# Patient Record
Sex: FEMALE | Employment: UNEMPLOYED | URBAN - METROPOLITAN AREA
[De-identification: names, ages, dates, MRNs, and addresses within clinical notes are randomized per-mention and may not be internally consistent; named-entity substitution may affect disease eponyms.]

---

## 2017-03-09 ENCOUNTER — OFFICE VISIT (OUTPATIENT)
Dept: FAMILY MEDICINE | Facility: CLINIC | Age: 22
End: 2017-03-09
Payer: COMMERCIAL

## 2017-03-09 VITALS
HEART RATE: 110 BPM | DIASTOLIC BLOOD PRESSURE: 81 MMHG | WEIGHT: 83.8 LBS | BODY MASS INDEX: 17.59 KG/M2 | SYSTOLIC BLOOD PRESSURE: 119 MMHG | HEIGHT: 58 IN | OXYGEN SATURATION: 98 %

## 2017-03-09 DIAGNOSIS — Z23 NEED FOR HPV VACCINATION: ICD-10-CM

## 2017-03-09 DIAGNOSIS — Z12.4 SCREENING FOR MALIGNANT NEOPLASM OF CERVIX: ICD-10-CM

## 2017-03-09 DIAGNOSIS — Z82.49 FAMILY HISTORY OF ISCHEMIC HEART DISEASE: Primary | ICD-10-CM

## 2017-03-09 DIAGNOSIS — Z23 NEED FOR TETANUS BOOSTER: ICD-10-CM

## 2017-03-09 DIAGNOSIS — Z01.419 PAP SMEAR, LOW-RISK: ICD-10-CM

## 2017-03-09 PROCEDURE — 99395 PREV VISIT EST AGE 18-39: CPT | Mod: 25 | Performed by: NURSE PRACTITIONER

## 2017-03-09 PROCEDURE — 90471 IMMUNIZATION ADMIN: CPT | Performed by: NURSE PRACTITIONER

## 2017-03-09 PROCEDURE — G0145 SCR C/V CYTO,THINLAYER,RESCR: HCPCS | Performed by: NURSE PRACTITIONER

## 2017-03-09 PROCEDURE — 90472 IMMUNIZATION ADMIN EACH ADD: CPT | Performed by: NURSE PRACTITIONER

## 2017-03-09 PROCEDURE — 90715 TDAP VACCINE 7 YRS/> IM: CPT | Performed by: NURSE PRACTITIONER

## 2017-03-09 PROCEDURE — 90649 4VHPV VACCINE 3 DOSE IM: CPT | Performed by: NURSE PRACTITIONER

## 2017-03-09 NOTE — MR AVS SNAPSHOT
After Visit Summary   3/9/2017    Sisi Tomlin    MRN: [id number]           Patient Information     Date Of Birth          1995        Visit Information        Provider Department      3/9/2017 11:20 AM Juanita Jose APRN [facility]        Today's Diagnoses     Family history of ischemic heart disease    -  1      Care Instructions      1. Schedule fasting cholesterol       Preventive Health Recommendations  Female Ages 18 to 25     Yearly exam:     See your health care provider every year in order to  o Review health changes.   o Discuss preventive care.    o Review your medicines if your doctor has prescribed any.      You should be tested each year for STDs (sexually transmitted diseases).       After age 20, talk to your provider about how often you should have cholesterol testing.      Starting at age 21, get a Pap test every three years. If you have an abnormal result, your doctor may have you test more often.      If you are at risk for diabetes, you should have a diabetes test (fasting glucose).     Shots:     Get a flu shot each year.     Get a tetanus shot every 10 years.     Consider getting the shot (vaccine) that prevents cervical cancer (Gardasil).    Nutrition:     Eat at least 5 servings of fruits and vegetables each day.    Eat whole-grain bread, whole-wheat pasta and brown rice instead of white grains and rice.    Talk to your provider about Calcium and Vitamin D.     Lifestyle    Exercise at least 150 minutes a week each week (30 minutes a day, 5 days a week). This will help you control your weight and prevent disease.    Limit alcohol to one drink per day.    No smoking.     Wear sunscreen to prevent skin cancer.    See your dentist every six months for an exam and cleaning.        Follow-ups after your visit        Future tests that were ordered for you today     Open Future Orders        Priority Expected Expires Ordered    Lipid Profile with  "reflex to direct LDL Routine 3/10/2017 3/9/2018 3/9/2017            Who to contact     If you have questions or need follow up information about today's clinic visit or your schedule please contact [facility] directly at [phone].  Normal or non-critical lab and imaging results will be communicated to you by MyChart, letter or phone within 4 business days after the clinic has received the results. If you do not hear from us within 7 days, please contact the clinic through MyChart or phone. If you have a critical or abnormal lab result, we will notify you by phone as soon as possible.  Submit refill requests through [facility] or call your pharmacy and they will forward the refill request to us. Please allow 3 business days for your refill to be completed.          Additional Information About Your Visit        MyChart Information     [facility] lets you send messages to your doctor, view your test results, renew your prescriptions, schedule appointments and more. To sign up, go to [email or web address] . Click on \"Log in\" on the left side of the screen, which will take you to the Welcome page. Then click on \"Sign up Now\" on the right side of the page.     You will be asked to enter the access code listed below, as well as some personal information. Please follow the directions to create your username and password.     Your access code is: [email or web address]  Expires: 2017 11:54 AM     Your access code will  in 90 days. If you need help or a new code, please call your [facility] or [phone].        Care EveryWhere ID     This is your Care EveryWhere ID. This could be used by other organizations to access your [place] medical records  [id number]        Your Vitals Were     Pulse Height Last Period Pulse Oximetry BMI (Body Mass Index)       110 4' 9.5\" (1.461 m) 2017 98% 17.82 kg/m2        Blood Pressure from Last 3 Encounters:   17 119/81   13 (!) 86/63   12 113/73 "    Weight from Last 3 Encounters:   03/09/17 83 lb 12.8 oz (38 kg)   07/22/13 86 lb (39 kg) (<1 %)*   06/13/12 88 lb 12.8 oz (40.3 kg) (<1 %)*     * Growth percentiles are based on [facility] 2-20 Years data.               Primary Care Provider Office Phone # Fax #    Taylor Polly Israel [phone]       XXX RESIGNED XXX [address] [address]        Thank you!     Thank you for choosing [facility]  for your care. Our goal is always to provide you with excellent care. Hearing back from our patients is one way we can continue to improve our services. Please take a few minutes to complete the written survey that you may receive in the mail after your visit with us. Thank you!             Your Updated Medication List - Protect others around you: Learn how to safely use, store and throw away your medicines at www.disposemymeds.org.          This list is accurate as of: 3/9/17 11:54 AM.  Always use your most recent med list.                   Brand Name Dispense Instructions for use    MULTI-DAY VITAMINS PO      None Entered

## 2017-03-09 NOTE — NURSING NOTE
"Initial /81 (BP Location: Left arm, Patient Position: Chair, Cuff Size: Adult Regular)  Pulse 110  Ht 4' 9.5\" (1.461 m)  Wt 83 lb 12.8 oz (38 kg)  LMP 02/04/2017  SpO2 98%  BMI 17.82 kg/m2 Estimated body mass index is 17.82 kg/(m^2) as calculated from the following:    Height as of this encounter: 4' 9.5\" (1.461 m).    Weight as of this encounter: 83 lb 12.8 oz (38 kg). .    Ivonne Mesa    "

## 2017-03-09 NOTE — PATIENT INSTRUCTIONS
1. Schedule fasting cholesterol       Preventive Health Recommendations  Female Ages 18 to 25     Yearly exam:     See your health care provider every year in order to  o Review health changes.   o Discuss preventive care.    o Review your medicines if your doctor has prescribed any.      You should be tested each year for STDs (sexually transmitted diseases).       After age 20, talk to your provider about how often you should have cholesterol testing.      Starting at age 21, get a Pap test every three years. If you have an abnormal result, your doctor may have you test more often.      If you are at risk for diabetes, you should have a diabetes test (fasting glucose).     Shots:     Get a flu shot each year.     Get a tetanus shot every 10 years.     Consider getting the shot (vaccine) that prevents cervical cancer (Gardasil).    Nutrition:     Eat at least 5 servings of fruits and vegetables each day.    Eat whole-grain bread, whole-wheat pasta and brown rice instead of white grains and rice.    Talk to your provider about Calcium and Vitamin D.     Lifestyle    Exercise at least 150 minutes a week each week (30 minutes a day, 5 days a week). This will help you control your weight and prevent disease.    Limit alcohol to one drink per day.    No smoking.     Wear sunscreen to prevent skin cancer.    See your dentist every six months for an exam and cleaning.

## 2017-03-09 NOTE — LETTER
[facility]  [address] [address]  [phone]      March 15, 2017    Sisi Tomlin  [address] [facility] [address]          Dear Sisi,    I am happy to inform you that your recent cervical cancer screening test (PAP smear) was normal.      Preventative screening such as this helps insure your health for years to come.  This test should be repeated in 3 years unless otherwise directed.    You will still need to return to the clinic every year for your annual exam and other preventive tests.    Please contact the clinic if you have further questions.      Sincerely,    MAURICIO Hillman CNP/rlm

## 2017-03-09 NOTE — PROGRESS NOTES
SUBJECTIVE:     CC: Sisi Tomlin is an 21 year old woman who presents for preventive health visit.     Healthy Habits:    Do you get at least three servings of calcium containing foods daily (dairy, green leafy vegetables, etc.)? yes    Amount of exercise or daily activities, outside of work: none    Problems taking medications regularly No    Medication side effects: No    Have you had an eye exam in the past two years? no    Do you see a dentist twice per year? yes    Do you have sleep apnea, excessive snoring or daytime drowsiness?no        Family history of heart disease- father had 5 stents placed    Today's PHQ-2 Score:   PHQ-2 ( 1999 Pfizer) 7/22/2013   Q1: Little interest or pleasure in doing things 0   Q2: Feeling down, depressed or hopeless 0   PHQ-2 Score 0       Abuse: Current or Past(Physical, Sexual or Emotional)- No  Do you feel safe in your environment - Yes    Social History   Substance Use Topics     Smoking status: Passive Smoke Exposure - Never Smoker     Smokeless tobacco: Never Used      Comment: when at grand parents house     Alcohol use No     The patient does not drink >3 drinks per day nor >7 drinks per week.    No results for input(s): CHOL, HDL, LDL, TRIG, CHOLHDLRATIO, NHDL in the last 73787 hours.    Reviewed orders with patient.  Reviewed health maintenance and updated orders accordingly - Yes    Mammo Decision Support:  Mammogram not appropriate for this patient based on age.    Pertinent mammograms are reviewed under the imaging tab.  History of abnormal Pap smear: NO - age 21-29 PAP every 3 years recommended    Reviewed and updated as needed this visit by clinical staff         Reviewed and updated as needed this visit by Provider            ROS:  C: NEGATIVE for fever, chills, change in weight  I: NEGATIVE for worrisome rashes, moles or lesions  E: NEGATIVE for vision changes or irritation  ENT: NEGATIVE for ear, mouth and throat problems  R: NEGATIVE for significant  cough or SOB  B: NEGATIVE for masses, tenderness or discharge  CV: NEGATIVE for chest pain, palpitations or peripheral edema  GI: NEGATIVE for nausea, abdominal pain, heartburn, or change in bowel habits  : NEGATIVE for unusual urinary or vaginal symptoms. Periods are regular.  M: NEGATIVE for significant arthralgias or myalgia  N: NEGATIVE for weakness, dizziness or paresthesias  P: NEGATIVE for changes in mood or affect    Problem list, Medication list, Allergies, and Medical/Social/Surgical histories reviewed in [facility] and updated as appropriate.  OBJECTIVE:     There were no vitals taken for this visit.  EXAM:  GENERAL: healthy, alert and no distress  EYES: Eyes grossly normal to inspection, PERRL and conjunctivae and sclerae normal  HENT: ear canals and TM's normal, nose and mouth without ulcers or lesions  NECK: no adenopathy, no asymmetry, masses, or scars and thyroid normal to palpation  RESP: lungs clear to auscultation - no rales, rhonchi or wheezes  CV: regular rate and rhythm, normal S1 S2, no S3 or S4, no murmur, click or rub, no peripheral edema and peripheral pulses strong  ABDOMEN: soft, nontender, no hepatosplenomegaly, no masses and bowel sounds normal   (female): normal female external genitalia, normal urethral meatus, vaginal mucosa pink, moist, well rugated, and normal cervix/adnexa/uterus without masses or discharge  MS: no gross musculoskeletal defects noted, no edema  SKIN: no suspicious lesions or rashes  NEURO: Normal strength and tone, mentation intact and speech normal  PSYCH: mentation appears normal, affect normal/bright    ASSESSMENT/PLAN:     1. Pap smear, low-risk      2. Family history of ischemic heart disease    - Lipid Profile with reflex to direct LDL; Future  - Pap imaged thin layer screen only - recommended age 21 - 24 years    3. Screening for malignant neoplasm of cervix      4. Need for tetanus booster  - TDAP (ADACEL AGES 11-64)    5. Need for HPV vaccination    - EA  "ADD'L VACCINE  - HUMAN PAPILLOMA VIRUS VACCINE    COUNSELING:   Reviewed preventive health counseling, as reflected in patient instructions       Regular exercise       Healthy diet/nutrition         reports that she is a non-smoker but has been exposed to tobacco smoke. She has never used smokeless tobacco.    Estimated body mass index is 18.61 kg/(m^2) as calculated from the following:    Height as of 7/22/13: 4' 9\" (1.448 m).    Weight as of 7/22/13: 86 lb (39 kg).   Weight management plan noted, stable and monitoring    Counseling Resources:  ATP IV Guidelines  Pooled Cohorts Equation Calculator  Breast Cancer Risk Calculator  FRAX Risk Assessment  ICSI Preventive Guidelines  Dietary Guidelines for Americans, 2010  USDA's MyPlate  ASA Prophylaxis  Lung CA Screening    MAURICIO Hillman [facility]  "

## 2017-03-13 DIAGNOSIS — Z82.49 FAMILY HISTORY OF ISCHEMIC HEART DISEASE: ICD-10-CM

## 2017-03-13 LAB
CHOLEST SERPL-MCNC: 118 MG/DL
COPATH REPORT: NORMAL
HDLC SERPL-MCNC: 57 MG/DL
LDLC SERPL CALC-MCNC: 48 MG/DL
NONHDLC SERPL-MCNC: 61 MG/DL
PAP: NORMAL
TRIGL SERPL-MCNC: 66 MG/DL

## 2017-03-13 PROCEDURE — 36415 COLL VENOUS BLD VENIPUNCTURE: CPT | Performed by: NURSE PRACTITIONER

## 2017-03-13 PROCEDURE — 80061 LIPID PANEL: CPT | Performed by: NURSE PRACTITIONER

## 2019-07-09 ENCOUNTER — OFFICE VISIT (OUTPATIENT)
Dept: FAMILY MEDICINE | Facility: CLINIC | Age: 24
End: 2019-07-09
Payer: COMMERCIAL

## 2019-07-09 VITALS
BODY MASS INDEX: 18.55 KG/M2 | HEIGHT: 57 IN | TEMPERATURE: 105 F | OXYGEN SATURATION: 99 % | HEART RATE: 97 BPM | DIASTOLIC BLOOD PRESSURE: 84 MMHG | RESPIRATION RATE: 14 BRPM | WEIGHT: 86 LBS | SYSTOLIC BLOOD PRESSURE: 110 MMHG

## 2019-07-09 DIAGNOSIS — Z00.00 ROUTINE GENERAL MEDICAL EXAMINATION AT A HEALTH CARE FACILITY: Primary | ICD-10-CM

## 2019-07-09 DIAGNOSIS — Z23 NEED FOR VACCINATION: ICD-10-CM

## 2019-07-09 PROCEDURE — 99395 PREV VISIT EST AGE 18-39: CPT | Performed by: NURSE PRACTITIONER

## 2019-07-09 PROCEDURE — 90651 9VHPV VACCINE 2/3 DOSE IM: CPT | Performed by: NURSE PRACTITIONER

## 2019-07-09 PROCEDURE — 90471 IMMUNIZATION ADMIN: CPT | Performed by: NURSE PRACTITIONER

## 2019-07-09 ASSESSMENT — MIFFLIN-ST. JEOR: SCORE: 1008.97

## 2019-07-09 NOTE — NURSING NOTE
Screening Questionnaire for Adult Immunization    Are you sick today?   No   Do you have allergies to medications, food, a vaccine component or latex?   No   Have you ever had a serious reaction after receiving a vaccination?   No   Do you have a long-term health problem with heart disease, lung disease, asthma, kidney disease, metabolic disease (e.g. diabetes), anemia, or other blood disorder?   No   Do you have cancer, leukemia, HIV/AIDS, or any other immune system problem?   No   In the past 3 months, have you taken medications that affect  your immune system, such as prednisone, other steroids, or anticancer drugs; drugs for the treatment of rheumatoid arthritis, Crohn s disease, or psoriasis; or have you had radiation treatments?   No   Have you had a seizure, or a brain or other nervous system problem?   No   During the past year, have you received a transfusion of blood or blood     products, or been given immune (gamma) globulin or antiviral drug?   No   For women: Are you pregnant or is there a chance you could become        pregnant during the next month?   No   Have you received any vaccinations in the past 4 weeks?   No     Immunization questionnaire answers were all negative.        Per orders of Juanita Jose, injection of Gardasil HPV given by vIonne Mesa. Patient instructed to remain in clinic for 15 minutes afterwards, and to report any adverse reaction to me immediately.       Screening performed by Ivonne Mesa on 7/9/2019 at 11:01 AM.

## 2019-07-09 NOTE — PATIENT INSTRUCTIONS
Thank you for choosing [facility].  You may be receiving an email and/or telephone survey request from [facility] [employer] regarding your visit today.  Please take a few minutes to respond to the survey to let us know how we are doing.      If you have questions or concerns, please contact us via [employer] or you can contact your care team at [phone].    Our Clinic hours are:  Monday 6:40 am  to 7:00 pm  Tuesday -Friday 6:40 am to 5:00 pm    The [place] outpatient lab hours are:  Monday - Friday 6:10 am to 4:45 pm  Saturdays 7:00 am to 11:00 am  Appointments are required, call [phone]    If you have clinical questions after hours or would like to schedule an appointment,  call the clinic at [phone].

## 2019-07-09 NOTE — PROGRESS NOTES
SUBJECTIVE:   CC: Sisi Tomlin is an 24 year old woman who presents for preventive health visit.     Healthy Habits:    Getting at least 3 servings of Calcium per day:  Yes    Bi-annual eye exam:  NO    Dental care twice a year:  Yes    Sleep apnea or symptoms of sleep apnea:  None    Diet:  Regular (no restrictions)    Frequency of exercise:  None    Taking medications regularly:  Not Applicable    Barriers to taking medications:  Not applicable    PHQ-2 Total Score:    Additional concerns today:  No              Today's PHQ-2 Score:   PHQ-2 ( 1999 Pfizer) 7/22/2013   Q1: Little interest or pleasure in doing things 0   Q2: Feeling down, depressed or hopeless 0   PHQ-2 Score 0       Abuse: Current or Past(Physical, Sexual or Emotional)- No  Do you feel safe in your environment? Yes    Social History     Tobacco Use     Smoking status: Passive Smoke Exposure - Never Smoker     Smokeless tobacco: Never Used     Tobacco comment: when at grand parents house   Substance Use Topics     Alcohol use: No     If you drink alcohol do you typically have >3 drinks per day or >7 drinks per week? No    Alcohol Use 3/9/2017   Prescreen: >3 drinks/day or >7 drinks/week? The patient does not drink >3 drinks per day nor >7 drinks per week.       Reviewed orders with patient.  Reviewed health maintenance and updated orders accordingly - Yes  BP Readings from Last 3 Encounters:   07/09/19 110/84   03/09/17 119/81   07/22/13 (!) 86/63    Wt Readings from Last 3 Encounters:   07/09/19 39 kg (86 lb)   03/09/17 38 kg (83 lb 12.8 oz)   07/22/13 39 kg (86 lb) (<1 %)*     * Growth percentiles are based on CDC (Girls, 2-20 Years) data.                    Mammogram not appropriate for this patient based on age.    Pertinent mammograms are reviewed under the imaging tab.  History of abnormal Pap smear:   Last 3 Pap and HPV Results:   PAP / HPV 3/9/2017   PAP NIL     PAP / HPV 3/9/2017   PAP NIL     Reviewed and updated as needed this  visit by clinical staff  Tobacco  Meds  Med Hx  Surg Hx  Fam Hx  Soc Hx        Reviewed and updated as needed this visit by Provider            Review of Systems  CONSTITUTIONAL: NEGATIVE for fever, chills, change in weight  INTEGUMENTARU/SKIN: NEGATIVE for worrisome rashes, moles or lesions  EYES: NEGATIVE for vision changes or irritation  ENT: NEGATIVE for ear, mouth and throat problems  RESP: NEGATIVE for significant cough or SOB  BREAST: NEGATIVE for masses, tenderness or discharge  CV: NEGATIVE for chest pain, palpitations or peripheral edema  GI: NEGATIVE for nausea, abdominal pain, heartburn, or change in bowel habits  : NEGATIVE for unusual urinary or vaginal symptoms. Periods are regular.  MUSCULOSKELETAL: NEGATIVE for significant arthralgias or myalgia  NEURO: NEGATIVE for weakness, dizziness or paresthesias  PSYCHIATRIC: NEGATIVE for changes in mood or affect     OBJECTIVE:   There were no vitals taken for this visit.  Physical Exam  GENERAL: healthy, alert and no distress  EYES: Eyes grossly normal to inspection, PERRL and conjunctivae and sclerae normal  HENT: ear canals and TM's normal, nose and mouth without ulcers or lesions  NECK: no adenopathy, no asymmetry, masses, or scars and thyroid normal to palpation  RESP: lungs clear to auscultation - no rales, rhonchi or wheezes  CV: regular rate and rhythm, normal S1 S2, no S3 or S4, no murmur, click or rub, no peripheral edema and peripheral pulses strong  ABDOMEN: soft, nontender, no hepatosplenomegaly, no masses and bowel sounds normal  MS: no gross musculoskeletal defects noted, no edema  SKIN: no suspicious lesions or rashes  NEURO: Normal strength and tone, mentation intact and speech normal  PSYCH: mentation appears normal, affect normal/bright    Diagnostic Test Results:  Labs reviewed in Epic    ASSESSMENT/PLAN:   1. Routine general medical examination at a health care facility        COUNSELING:  Reviewed preventive health counseling, as  "reflected in patient instructions       Regular exercise       Healthy diet/nutrition       Immunizations    Vaccinated for: HPV          Estimated body mass index is 17.82 kg/m  as calculated from the following:    Height as of 3/9/17: 1.461 m (4' 9.5\").    Weight as of 3/9/17: 38 kg (83 lb 12.8 oz).    Weight management plan noted, stable and monitoring     reports that she is a non-smoker but has been exposed to tobacco smoke. She has never used smokeless tobacco.      Counseling Resources:  ATP IV Guidelines  Pooled Cohorts Equation Calculator  Breast Cancer Risk Calculator  FRAX Risk Assessment  ICSI Preventive Guidelines  Dietary Guidelines for Americans, 2010  USDA's MyPlate  ASA Prophylaxis  Lung CA Screening    MAURICIO Hillman CNP  [facility] [place] [facility]  "

## 2020-06-11 ENCOUNTER — HOSPITAL ENCOUNTER (EMERGENCY)
Facility: CLINIC | Age: 25
Discharge: HOME | End: 2020-06-12
Attending: FAMILY MEDICINE | Admitting: FAMILY MEDICINE
Payer: COMMERCIAL

## 2020-06-11 VITALS
BODY MASS INDEX: 19.48 KG/M2 | TEMPERATURE: 98.1 F | HEART RATE: 100 BPM | DIASTOLIC BLOOD PRESSURE: 86 MMHG | SYSTOLIC BLOOD PRESSURE: 120 MMHG | WEIGHT: 90 LBS | RESPIRATION RATE: 16 BRPM | OXYGEN SATURATION: 95 %

## 2020-06-11 DIAGNOSIS — S02.651A: ICD-10-CM

## 2020-06-11 DIAGNOSIS — R55 VASOVAGAL SYNCOPE: ICD-10-CM

## 2020-06-11 DIAGNOSIS — R68.84 JAW PAIN: ICD-10-CM

## 2020-06-11 PROCEDURE — 81025 URINE PREGNANCY TEST: CPT | Performed by: EMERGENCY MEDICINE

## 2020-06-11 PROCEDURE — 99285 EMERGENCY DEPT VISIT HI MDM: CPT | Mod: 25

## 2020-06-11 PROCEDURE — 25000132 ZZH RX MED GY IP 250 OP 250 PS 637: Performed by: EMERGENCY MEDICINE

## 2020-06-11 PROCEDURE — 93010 ELECTROCARDIOGRAM REPORT: CPT | Mod: Z6 | Performed by: EMERGENCY MEDICINE

## 2020-06-11 PROCEDURE — 99285 EMERGENCY DEPT VISIT HI MDM: CPT | Mod: 25 | Performed by: EMERGENCY MEDICINE

## 2020-06-11 PROCEDURE — 93005 ELECTROCARDIOGRAM TRACING: CPT

## 2020-06-11 RX ORDER — IBUPROFEN 100 MG/5ML
10 SUSPENSION, ORAL (FINAL DOSE FORM) ORAL ONCE
Status: COMPLETED | OUTPATIENT
Start: 2020-06-11 | End: 2020-06-11

## 2020-06-11 RX ORDER — METHYLCELLULOSE 4000CPS 30 %
POWDER (GRAM) MISCELLANEOUS ONCE
Status: DISCONTINUED | OUTPATIENT
Start: 2020-06-11 | End: 2020-06-11

## 2020-06-11 RX ADMIN — IBUPROFEN 400 MG: 100 SUSPENSION ORAL at 22:17

## 2020-06-11 NOTE — ED AVS SNAPSHOT
[facility] Emergency Department  [address] [address]  Phone:  [phone]  Fax:  [phone]                                    Sisi Tomlin   MRN: [id number]    Department:  [facility] Emergency Department   Date of Visit:  6/11/2020           After Visit Summary Signature Page    I have received my discharge instructions, and my questions have been answered. I have discussed any challenges I see with this plan with the nurse or doctor.    ..........................................................................................................................................  Patient/Patient Representative Signature      ..........................................................................................................................................  Patient Representative Print Name and Relationship to Patient    ..................................................               ................................................  Date                                   Time    ..........................................................................................................................................  Reviewed by Signature/Title    ...................................................              ..............................................  Date                                               Time          22EPIC Rev 08/18

## 2020-06-12 ENCOUNTER — APPOINTMENT (OUTPATIENT)
Dept: CT IMAGING | Facility: CLINIC | Age: 25
End: 2020-06-12
Attending: EMERGENCY MEDICINE
Payer: COMMERCIAL

## 2020-06-12 DIAGNOSIS — S02.621A CLOSED SUBCONDYLAR FRACTURE OF RIGHT SIDE OF MANDIBLE, INITIAL ENCOUNTER (H): Primary | ICD-10-CM

## 2020-06-12 DIAGNOSIS — Z11.59 ENCOUNTER FOR SCREENING FOR OTHER VIRAL DISEASES: Primary | ICD-10-CM

## 2020-06-12 LAB — HCG UR QL: NEGATIVE

## 2020-06-12 PROCEDURE — 70486 CT MAXILLOFACIAL W/O DYE: CPT

## 2020-06-12 NOTE — DISCHARGE INSTRUCTIONS
Use acetaminophen 650 mg and ibuprofen 600 mg every 6 hours as needed for pain.  Eat a soft diet of smoothies and protein shakes.  Follow-up in maxillofacial surgery clinic later today at 10 AM.

## 2020-06-12 NOTE — H&P
ORAL & MAXILLOFACIAL SURGERY   HISTORY & PHYSICAL  Sisi Tomlin,  MRN: [id number],  : 1995        HISTORY OF PRESENT ILLNESS:   24 y/o F presenting for evaluation after LOC and subsequent fall from standing on 20. Patient was seen at [facility] ED where syncopal workup was negative and likely to be related to vasovagal cause. CT imaging obtained showed medially displaced right subcondylar mandible fracture for which patient was referred to [facility] for further evaluation. Patient denies any constitutional symptoms at this time, but does endorse malocclusion.    PMH:  Asperger's/ASD    PAST SURGICAL HISTORY:  3rd molar surgery under IV GA (no complications)    MEDICATIONS:  Ibuprofen PRN    ALLERGIES:  NKDA    SOCIAL HISTORY:  Denies    REVIEW OF SYSTEMS:  ROS reviewed and negative aside from listed in HPI    PHYSICAL EXAM:  GEN: Small stature F, NAD  HEENT: NC/AT, EOMI, PERRL, no facial edema, induration or erythema, no abnormal skin lesions, inferior border of mandible is palpable bilaterally, mild tenderness to palpation at right preauricular area, neck soft with no palpable lymph nodes, NOHEMY ~20mm, OP clear, uvula midline, fair oral hygiene, intact adult dentition with moderate anterior open bite, no vestibular edema, FOM ND/NT, no erythema/ulcerations/pigmentations/exophytic lesions  CV: Warm and well-perfused, RRR, no murmurs/rubs/gallops.  PULM: Breathing comfortably on room air, CTAB, no rales/rhonchi/wheezing  GI: soft, ND/NT  MSK: KAMARA, 5/5 strength to all extremities, no peripheral extremity edema  Neuro: AAOx4, CN II-XII intact bilaterally    IMAGING:  CT facial bones w/o contrast (20) - Read from OSH:  IMPRESSION:   Acute fracture through the right mandibular neck with medial displacement and angulation of the right mandibular condyle which is displaced out of the TMJ.    ASSESSMENT:   24 y/o F presenting with medially displaced right subcondylar mandible fracture s/p LOC and  subsequent fall from standing on 6/11/20. Following clinical exam and interpretation of imaging, indication for operative intervention due to the patient's acquired dental malocclusion 2/2 her right subcondylar fracture. Discussion occurred in regard to treatment options including closed vs open treatment, and it was decided that closed reduction with application of Torres arch bars with elastic MMF would be best treatment option. Patient and her father agree with proposed treatment, all questions invited and answered. Informed consent for procedure obtained.    PLAN:  Closed reduction with application of Torres arch bars with elastic MMF in the OR under general anesthesia.       Findings, assessment, and plan discussed with Dr. Akshat Hall, DMD  Oral & Maxillofacial Surgery, PGY-2  Pager: [phone]

## 2020-06-12 NOTE — ED NOTES
Pt was in the house by herself, dad heard a thud and ran inside and found her on the floor. Mom ran inside after her and asked her what happened. She states that she fell but didn't remember what happened. She is c/o right sided jaw pain and says her jaw feels weird and has a broken front bottom tooth.

## 2020-06-12 NOTE — ED TRIAGE NOTES
Pt comes in with Mom following unwitnessed LOC. Parents went out for walk and returned to find patient laying on the floor with blood. Has chin laceration, right dental pain. Denies pain elsewhere. Unsure cause of LOC.

## 2020-06-12 NOTE — ED PROVIDER NOTES
History     Chief Complaint   Patient presents with     Fall     LOC unwitnessed. Found laying on the floor. Chin laceration. Right dental pain.      HPI  Sisi Tomlin is a 25 year old female who presents for evaluation after a loss of consciousness.  The patient was in the house on her own with her father who was in the bathroom. This happened just prior to arrival.  He heard a thud and ran and found her laying on the floor and unconscious.  He called for help and the patient's mother was in the garage and came running into.  She saw him holding the patient on the ground and she was waking up and slightly confused but coming around.  The whole episode lasted for less than a minute.  She says she felt slightly lightheaded and sick to her stomach prior to this episode and then does not remember anything until waking up in her father's arms.  She did not bite her tongue or have incontinence.  She says she feels well now except for pain in the left side of her jaw.  Pain is aching, moderate in severity, she has not taking medicine for this.  She feels like her teeth do not line up normally.  She denies headache, changes in vision, neck pain, chest pain, difficulty breathing, shortness of breath, abdominal pain, nausea, vomiting, dysuria, or focal numbness or weakness.  Normal menstrual period was last week.    Allergies:  No Known Allergies    Problem List:    Patient Active Problem List    Diagnosis Date Noted     Asperger's syndrome 07/28/2010     Priority: Medium     Followed and has had evaluations, no formal diagnosis          Past Medical History:    No past medical history on file.    Past Surgical History:    No past surgical history on file.    Family History:    Family History   Problem Relation Age of Onset     Diabetes Maternal Grandmother      Hypertension Maternal Grandmother        Social History:  Marital Status:  Single [1]  Social History     Tobacco Use     Smoking status: Passive Smoke  Exposure - Never Smoker     Smokeless tobacco: Never Used     Tobacco comment: when at grand parents house   Substance Use Topics     Alcohol use: No     Drug use: No        Medications:    MULTI-DAY VITAMINS PO          Review of Systems  Pertinent positives and negatives listed in the HPI, all other systems reviewed and are negative.    Physical Exam   BP: 107/75  Pulse: 89  Temp: 98.1  F (36.7  C)  Resp: 16  Weight: 40.8 kg (90 lb)  SpO2: 95 %      Physical Exam  /86 (BP Location: Right arm)   Pulse 100   Temp 98.1  F (36.7  C) (Temporal)   Resp 16   Wt 40.8 kg (90 lb)   LMP 06/04/2020 (Exact Date)   SpO2 95%   BMI 19.48 kg/m     GENERAL: Alert, cooperative, mild distress  HEAD: No scalp laceration, hematoma, or abrasion.  No tenderness.  EYES: Conjunctivae clear, EOM intact. PERLL, Pupils are 3 mm.  HEENT:  Normal external ears, normal TM's without evidence of hemotympanum.  No nasal discharge or evidence of septal hematoma. No facial instability or asymmetry. No evidence of intraoral trauma.  Tenderness with palpation of the left side of her mandible with inability to bite down secondary to pain. Small wound to the chin.  NECK: Full painless range of motion.  No midline tenderness, no lateral tenderness.  CHEST:  No crepitus or tenderness with AP or lateral compression  CARDIOVASCULAR : Nml rate, distal pulses are normal and symmetric  LUNGS: No respiratory distress.  GI: Soft, ND, NT.   PELVIS: No crepitus or tenderness with AP or lateral compression  BACK: No step-offs palpated, no tenderness to palpation of thoracic or lumbar spine.  EXTREMITIES: No obvious deformities, no tenderness to palpation.  NEUROLOGICAL : GCS= 15.  Awake, alert, and oriented x 3.  Cranial nerves II-XII grossly intact. Normal muscle strength and tone, sensation to light touch grossly intact in all extremities.  No focal deficits.   SKIN : Normal color, no jaundice or rash. No abrasions.      ED Course         Procedures               EKG Interpretation:      Interpreted by Maurisio Veras MD  Time reviewed: 2212  Symptoms at time of EKG: Syncope   Rhythm: normal sinus   Rate: normal  Axis: normal  Ectopy: none  Conduction: normal  ST Segments/ T Waves: No ST-T wave changes  Q Waves: none  Comparison to prior: No old EKG available    Clinical Impression: normal EKG    Critical Care time:  none               Results for orders placed or performed during the hospital encounter of 06/11/20 (from the past 24 hour(s))   HCG qualitative urine   Result Value Ref Range    HCG Qual Urine Negative NEG^Negative   CT Facial Bones without Contrast    Narrative    EXAM: CT FACIAL BONES WITHOUT CONTRAST  LOCATION: [facility]  DATE/TIME: 6/12/2020 12:46 AM    INDICATION: Maxface trauma blunt  COMPARISON: None.  TECHNIQUE: Routine without IV contrast. Multiplanar reformats. Dose reduction techniques were used.     FINDINGS:  OSSEOUS STRUCTURES/SOFT TISSUES: No localized soft tissue swelling/inflammation. There is an acute fracture right mandibular neck with medial displacement of the right mandibular condyle out of the temporal mandibular joint. No evidence for dental trauma   or periapical abscess.    ORBITAL CONTENTS: No acute abnormality.    SINUSES: No paranasal sinus mucosal disease.    VISUALIZED INTRACRANIAL CONTENTS: No acute abnormality.       Impression    IMPRESSION:   1.  Acute fracture through the right mandibular neck with medial displacement and angulation of the right mandibular condyle which is displaced out of the TMJ.         Medications   ibuprofen (ADVIL/MOTRIN) suspension 400 mg (400 mg Oral Given 6/11/20 2217)       Assessments & Plan (with Medical Decision Making)   25-year-old female who presents for evaluation after a loss of consciousness.  Blood pressure is 107/75, temperature is 98.1  F, heart 89, SPO2 is 95% on room air.  She is given ibuprofen for the pain.  EKG is shows sinus rhythm  without signs of ischemia or dysrhythmia such as WPW, Brugada syndrome, arrhythmogenic right ventricular dysplasia, or prolonged QT syndrome.  No family history of sudden cardiac death.  Urine pregnancy test is negative, no signs of ectopic pregnancy.  CT of the jaw obtained, images reviewed independently as well as radiology read reviewed, positive for fracture through the mandibular neck on the right with displacement and angulation of the mandibular condyle.  I spoke on the phone with Dr. Atkins with OMFS at the [facility].  He recommended having the patient follow-up later today in their clinic at 10 AM.  I asked about keeping the patient n.p.o. and he said that the patient could eat and drink as she wanted at is it was unlikely they were going to perform surgery today.  The patient is discharged, she was offered opioid pain medicine but declined.  She was told to use acetaminophen and ibuprofen as needed for pain, return if she has difficulty breathing or other concerns, otherwise follow-up in maxillofacial surgery clinic at [facility] later today at 10 AM.  The patient is in agreement with this plan.    I have reviewed the nursing notes.    I have reviewed the findings, diagnosis, plan and need for follow up with the patient.       New Prescriptions    No medications on file       Final diagnoses:   Vasovagal syncope   Jaw pain   Closed fracture of right mandibular angle, initial encounter (H)       6/11/2020   [facility] EMERGENCY DEPARTMENT     Maurisio Veras MD  06/12/20 0207

## 2020-06-13 DIAGNOSIS — Z11.59 ENCOUNTER FOR SCREENING FOR OTHER VIRAL DISEASES: ICD-10-CM

## 2020-06-13 PROCEDURE — 99000 SPECIMEN HANDLING OFFICE-LAB: CPT | Performed by: DENTIST

## 2020-06-13 PROCEDURE — U0003 INFECTIOUS AGENT DETECTION BY NUCLEIC ACID (DNA OR RNA); SEVERE ACUTE RESPIRATORY SYNDROME CORONAVIRUS 2 (SARS-COV-2) (CORONAVIRUS DISEASE [COVID-19]), AMPLIFIED PROBE TECHNIQUE, MAKING USE OF HIGH THROUGHPUT TECHNOLOGIES AS DESCRIBED BY CMS-2020-01-R: HCPCS | Performed by: DENTIST

## 2020-06-14 LAB
SARS-COV-2 RNA SPEC QL NAA+PROBE: NOT DETECTED
SPECIMEN SOURCE: NORMAL

## 2020-06-16 ENCOUNTER — ANESTHESIA (OUTPATIENT)
Dept: SURGERY | Facility: CLINIC | Age: 25
End: 2020-06-16
Payer: COMMERCIAL

## 2020-06-16 ENCOUNTER — HOSPITAL ENCOUNTER (OUTPATIENT)
Facility: CLINIC | Age: 25
Discharge: HOME | End: 2020-06-16
Attending: DENTIST | Admitting: DENTIST
Payer: COMMERCIAL

## 2020-06-16 ENCOUNTER — ANESTHESIA EVENT (OUTPATIENT)
Dept: SURGERY | Facility: CLINIC | Age: 25
End: 2020-06-16
Payer: COMMERCIAL

## 2020-06-16 VITALS
HEART RATE: 120 BPM | WEIGHT: 92.59 LBS | DIASTOLIC BLOOD PRESSURE: 84 MMHG | TEMPERATURE: 98.1 F | SYSTOLIC BLOOD PRESSURE: 127 MMHG | OXYGEN SATURATION: 98 % | BODY MASS INDEX: 20.83 KG/M2 | HEIGHT: 56 IN | RESPIRATION RATE: 16 BRPM

## 2020-06-16 DIAGNOSIS — S02.621A CLOSED SUBCONDYLAR FRACTURE OF RIGHT SIDE OF MANDIBLE, INITIAL ENCOUNTER (H): ICD-10-CM

## 2020-06-16 DIAGNOSIS — S02.621A CLOSED SUBCONDYLAR FRACTURE OF RIGHT SIDE OF MANDIBLE, INITIAL ENCOUNTER (H): Primary | ICD-10-CM

## 2020-06-16 LAB
GLUCOSE BLDC GLUCOMTR-MCNC: 83 MG/DL (ref 70–99)
HCG UR QL: NEGATIVE

## 2020-06-16 PROCEDURE — 71000027 ZZH RECOVERY PHASE 2 EACH 15 MINS: Performed by: DENTIST

## 2020-06-16 PROCEDURE — 36000053 ZZH SURGERY LEVEL 2 EA 15 ADDTL MIN - UMMC: Performed by: DENTIST

## 2020-06-16 PROCEDURE — 40000170 ZZH STATISTIC PRE-PROCEDURE ASSESSMENT II: Performed by: DENTIST

## 2020-06-16 PROCEDURE — 25000132 ZZH RX MED GY IP 250 OP 250 PS 637: Performed by: ANESTHESIOLOGY

## 2020-06-16 PROCEDURE — 25800030 ZZH RX IP 258 OP 636: Performed by: NURSE ANESTHETIST, CERTIFIED REGISTERED

## 2020-06-16 PROCEDURE — 37000008 ZZH ANESTHESIA TECHNICAL FEE, 1ST 30 MIN: Performed by: DENTIST

## 2020-06-16 PROCEDURE — 71000014 ZZH RECOVERY PHASE 1 LEVEL 2 FIRST HR: Performed by: DENTIST

## 2020-06-16 PROCEDURE — 25000128 H RX IP 250 OP 636: Performed by: ANESTHESIOLOGY

## 2020-06-16 PROCEDURE — 82962 GLUCOSE BLOOD TEST: CPT

## 2020-06-16 PROCEDURE — 25000566 ZZH SEVOFLURANE, EA 15 MIN: Performed by: DENTIST

## 2020-06-16 PROCEDURE — 25000128 H RX IP 250 OP 636: Performed by: STUDENT IN AN ORGANIZED HEALTH CARE EDUCATION/TRAINING PROGRAM

## 2020-06-16 PROCEDURE — 25000132 ZZH RX MED GY IP 250 OP 250 PS 637: Performed by: DENTIST

## 2020-06-16 PROCEDURE — 25000125 ZZHC RX 250: Performed by: DENTIST

## 2020-06-16 PROCEDURE — 25000132 ZZH RX MED GY IP 250 OP 250 PS 637: Performed by: STUDENT IN AN ORGANIZED HEALTH CARE EDUCATION/TRAINING PROGRAM

## 2020-06-16 PROCEDURE — 25000125 ZZHC RX 250: Performed by: NURSE ANESTHETIST, CERTIFIED REGISTERED

## 2020-06-16 PROCEDURE — 71000015 ZZH RECOVERY PHASE 1 LEVEL 2 EA ADDTL HR: Performed by: DENTIST

## 2020-06-16 PROCEDURE — 25000128 H RX IP 250 OP 636: Performed by: NURSE ANESTHETIST, CERTIFIED REGISTERED

## 2020-06-16 PROCEDURE — 27210794 ZZH OR GENERAL SUPPLY STERILE: Performed by: DENTIST

## 2020-06-16 PROCEDURE — 81025 URINE PREGNANCY TEST: CPT | Performed by: ANESTHESIOLOGY

## 2020-06-16 PROCEDURE — 37000009 ZZH ANESTHESIA TECHNICAL FEE, EACH ADDTL 15 MIN: Performed by: DENTIST

## 2020-06-16 PROCEDURE — 36000051 ZZH SURGERY LEVEL 2 1ST 30 MIN - UMMC: Performed by: DENTIST

## 2020-06-16 DEVICE — WIRE SURGICAL STEEL 24GA 2 DS-24: Type: IMPLANTABLE DEVICE | Status: FUNCTIONAL

## 2020-06-16 RX ORDER — ONDANSETRON 2 MG/ML
INJECTION INTRAMUSCULAR; INTRAVENOUS PRN
Status: DISCONTINUED | OUTPATIENT
Start: 2020-06-16 | End: 2020-06-16

## 2020-06-16 RX ORDER — ACETAMINOPHEN 160 MG/5ML
650 LIQUID ORAL EVERY 6 HOURS PRN
Qty: 473 BOTTLE | Refills: 0 | Status: SHIPPED | OUTPATIENT
Start: 2020-06-16 | End: 2020-06-26

## 2020-06-16 RX ORDER — SODIUM CHLORIDE, SODIUM LACTATE, POTASSIUM CHLORIDE, CALCIUM CHLORIDE 600; 310; 30; 20 MG/100ML; MG/100ML; MG/100ML; MG/100ML
INJECTION, SOLUTION INTRAVENOUS CONTINUOUS PRN
Status: DISCONTINUED | OUTPATIENT
Start: 2020-06-16 | End: 2020-06-16

## 2020-06-16 RX ORDER — FENTANYL CITRATE 50 UG/ML
25-50 INJECTION, SOLUTION INTRAMUSCULAR; INTRAVENOUS
Status: DISCONTINUED | OUTPATIENT
Start: 2020-06-16 | End: 2020-06-16 | Stop reason: HOSPADM

## 2020-06-16 RX ORDER — CEFAZOLIN SODIUM 1 G/3ML
1 INJECTION, POWDER, FOR SOLUTION INTRAMUSCULAR; INTRAVENOUS SEE ADMIN INSTRUCTIONS
Status: DISCONTINUED | OUTPATIENT
Start: 2020-06-16 | End: 2020-06-16 | Stop reason: HOSPADM

## 2020-06-16 RX ORDER — PROPOFOL 10 MG/ML
INJECTION, EMULSION INTRAVENOUS PRN
Status: DISCONTINUED | OUTPATIENT
Start: 2020-06-16 | End: 2020-06-16

## 2020-06-16 RX ORDER — MEPERIDINE HYDROCHLORIDE 25 MG/ML
12.5 INJECTION INTRAMUSCULAR; INTRAVENOUS; SUBCUTANEOUS
Status: DISCONTINUED | OUTPATIENT
Start: 2020-06-16 | End: 2020-06-16 | Stop reason: HOSPADM

## 2020-06-16 RX ORDER — ONDANSETRON 4 MG/1
4 TABLET, ORALLY DISINTEGRATING ORAL EVERY 30 MIN PRN
Status: DISCONTINUED | OUTPATIENT
Start: 2020-06-16 | End: 2020-06-16 | Stop reason: HOSPADM

## 2020-06-16 RX ORDER — OXYCODONE HCL 5 MG/5 ML
2.5 SOLUTION, ORAL ORAL EVERY 6 HOURS PRN
Qty: 30 ML | Refills: 0 | Status: SHIPPED | OUTPATIENT
Start: 2020-06-16 | End: 2020-06-19

## 2020-06-16 RX ORDER — CHLORHEXIDINE GLUCONATE ORAL RINSE 1.2 MG/ML
SOLUTION DENTAL PRN
Status: DISCONTINUED | OUTPATIENT
Start: 2020-06-16 | End: 2020-06-16 | Stop reason: HOSPADM

## 2020-06-16 RX ORDER — FENTANYL CITRATE 50 UG/ML
INJECTION, SOLUTION INTRAMUSCULAR; INTRAVENOUS PRN
Status: DISCONTINUED | OUTPATIENT
Start: 2020-06-16 | End: 2020-06-16

## 2020-06-16 RX ORDER — BACITRACIN ZINC 500 [USP'U]/G
OINTMENT TOPICAL PRN
Status: DISCONTINUED | OUTPATIENT
Start: 2020-06-16 | End: 2020-06-16 | Stop reason: HOSPADM

## 2020-06-16 RX ORDER — OXYCODONE HCL 10 MG/1
10 TABLET, FILM COATED, EXTENDED RELEASE ORAL EVERY 12 HOURS
Status: DISCONTINUED | OUTPATIENT
Start: 2020-06-16 | End: 2020-06-16 | Stop reason: HOSPADM

## 2020-06-16 RX ORDER — BUPIVACAINE HYDROCHLORIDE AND EPINEPHRINE 2.5; 5 MG/ML; UG/ML
INJECTION, SOLUTION INFILTRATION; PERINEURAL PRN
Status: DISCONTINUED | OUTPATIENT
Start: 2020-06-16 | End: 2020-06-16 | Stop reason: HOSPADM

## 2020-06-16 RX ORDER — OXYCODONE HCL 5 MG/5 ML
5 SOLUTION, ORAL ORAL ONCE
Status: COMPLETED | OUTPATIENT
Start: 2020-06-16 | End: 2020-06-16

## 2020-06-16 RX ORDER — SODIUM CHLORIDE, SODIUM LACTATE, POTASSIUM CHLORIDE, CALCIUM CHLORIDE 600; 310; 30; 20 MG/100ML; MG/100ML; MG/100ML; MG/100ML
INJECTION, SOLUTION INTRAVENOUS CONTINUOUS
Status: DISCONTINUED | OUTPATIENT
Start: 2020-06-16 | End: 2020-06-16 | Stop reason: HOSPADM

## 2020-06-16 RX ORDER — DEXAMETHASONE SODIUM PHOSPHATE 4 MG/ML
INJECTION, SOLUTION INTRA-ARTICULAR; INTRALESIONAL; INTRAMUSCULAR; INTRAVENOUS; SOFT TISSUE PRN
Status: DISCONTINUED | OUTPATIENT
Start: 2020-06-16 | End: 2020-06-16

## 2020-06-16 RX ORDER — NALOXONE HYDROCHLORIDE 0.4 MG/ML
.1-.4 INJECTION, SOLUTION INTRAMUSCULAR; INTRAVENOUS; SUBCUTANEOUS
Status: DISCONTINUED | OUTPATIENT
Start: 2020-06-16 | End: 2020-06-16 | Stop reason: HOSPADM

## 2020-06-16 RX ORDER — CEFAZOLIN SODIUM 2 G/100ML
2 INJECTION, SOLUTION INTRAVENOUS
Status: COMPLETED | OUTPATIENT
Start: 2020-06-16 | End: 2020-06-16

## 2020-06-16 RX ORDER — CHLORHEXIDINE GLUCONATE ORAL RINSE 1.2 MG/ML
10 SOLUTION DENTAL ONCE
Qty: 10 ML | Refills: 0 | Status: CANCELLED | OUTPATIENT
Start: 2020-06-16 | End: 2020-06-16

## 2020-06-16 RX ORDER — ACETAMINOPHEN 325 MG/1
975 TABLET ORAL ONCE
Status: DISCONTINUED | OUTPATIENT
Start: 2020-06-16 | End: 2020-06-16 | Stop reason: ALTCHOICE

## 2020-06-16 RX ORDER — LIDOCAINE HYDROCHLORIDE 20 MG/ML
INJECTION, SOLUTION INFILTRATION; PERINEURAL PRN
Status: DISCONTINUED | OUTPATIENT
Start: 2020-06-16 | End: 2020-06-16

## 2020-06-16 RX ORDER — ONDANSETRON 2 MG/ML
4 INJECTION INTRAMUSCULAR; INTRAVENOUS EVERY 30 MIN PRN
Status: DISCONTINUED | OUTPATIENT
Start: 2020-06-16 | End: 2020-06-16 | Stop reason: HOSPADM

## 2020-06-16 RX ORDER — CHLORHEXIDINE GLUCONATE ORAL RINSE 1.2 MG/ML
15 SOLUTION DENTAL 2 TIMES DAILY
Qty: 473 ML | Refills: 1 | Status: SHIPPED | OUTPATIENT
Start: 2020-06-16 | End: 2020-07-16

## 2020-06-16 RX ORDER — LIDOCAINE 40 MG/G
CREAM TOPICAL
Status: DISCONTINUED | OUTPATIENT
Start: 2020-06-16 | End: 2020-06-16 | Stop reason: HOSPADM

## 2020-06-16 RX ORDER — CHLORHEXIDINE GLUCONATE ORAL RINSE 1.2 MG/ML
10 SOLUTION DENTAL ONCE
Status: COMPLETED | OUTPATIENT
Start: 2020-06-16 | End: 2020-06-16

## 2020-06-16 RX ADMIN — PROPOFOL 100 MG: 10 INJECTION, EMULSION INTRAVENOUS at 13:05

## 2020-06-16 RX ADMIN — FENTANYL CITRATE 100 MCG: 50 INJECTION, SOLUTION INTRAMUSCULAR; INTRAVENOUS at 13:04

## 2020-06-16 RX ADMIN — MIDAZOLAM 1 MG: 1 INJECTION INTRAMUSCULAR; INTRAVENOUS at 12:52

## 2020-06-16 RX ADMIN — OXYCODONE HYDROCHLORIDE 5 MG: 5 SOLUTION ORAL at 15:34

## 2020-06-16 RX ADMIN — SUGAMMADEX 100 MG: 100 INJECTION, SOLUTION INTRAVENOUS at 14:20

## 2020-06-16 RX ADMIN — CHLORHEXIDINE GLUCONATE 0.12% ORAL RINSE 10 ML: 1.2 LIQUID ORAL at 12:35

## 2020-06-16 RX ADMIN — LIDOCAINE HYDROCHLORIDE 60 MG: 20 INJECTION, SOLUTION INFILTRATION; PERINEURAL at 13:03

## 2020-06-16 RX ADMIN — ACETAMINOPHEN 975 MG: 325 SOLUTION ORAL at 12:32

## 2020-06-16 RX ADMIN — ONDANSETRON 4 MG: 2 INJECTION INTRAMUSCULAR; INTRAVENOUS at 15:28

## 2020-06-16 RX ADMIN — HYDROMORPHONE HYDROCHLORIDE 0.25 MG: 1 INJECTION, SOLUTION INTRAMUSCULAR; INTRAVENOUS; SUBCUTANEOUS at 13:23

## 2020-06-16 RX ADMIN — ROCURONIUM BROMIDE 40 MG: 10 INJECTION INTRAVENOUS at 13:06

## 2020-06-16 RX ADMIN — FENTANYL CITRATE 25 MCG: 50 INJECTION INTRAMUSCULAR; INTRAVENOUS at 15:11

## 2020-06-16 RX ADMIN — SODIUM CHLORIDE, POTASSIUM CHLORIDE, SODIUM LACTATE AND CALCIUM CHLORIDE: 600; 310; 30; 20 INJECTION, SOLUTION INTRAVENOUS at 12:51

## 2020-06-16 RX ADMIN — ONDANSETRON 4 MG: 2 INJECTION INTRAMUSCULAR; INTRAVENOUS at 13:15

## 2020-06-16 RX ADMIN — MIDAZOLAM 1 MG: 1 INJECTION INTRAMUSCULAR; INTRAVENOUS at 13:00

## 2020-06-16 RX ADMIN — CEFAZOLIN 2 G: 10 INJECTION, POWDER, FOR SOLUTION INTRAVENOUS at 13:20

## 2020-06-16 RX ADMIN — FENTANYL CITRATE 25 MCG: 50 INJECTION INTRAMUSCULAR; INTRAVENOUS at 14:49

## 2020-06-16 RX ADMIN — DEXAMETHASONE SODIUM PHOSPHATE 8 MG: 4 INJECTION, SOLUTION INTRAMUSCULAR; INTRAVENOUS at 13:15

## 2020-06-16 ASSESSMENT — MIFFLIN-ST. JEOR: SCORE: 1018

## 2020-06-16 NOTE — ANESTHESIA CARE TRANSFER NOTE
Patient: Sisi Tomlin    Procedure(s):  Closed Reduction Right Mandible Subcondylar Fracture with Application of Arch Bars and Maxillomandibular Elastic Fixation    Diagnosis: Closed subcondylar fracture of right side of mandible, initial encounter (H) [S02.621A]  Diagnosis Additional Information: No value filed.    Anesthesia Type:   General     Note:  Airway :Room Air  Patient transferred to:PACU  Comments: Vinod Report: Identifed the Patient, Identified the Reponsible Provider, Reviewed the pertinent medical history, Discussed the surgical course, Reviewed Intra-OP anesthesia mangement and issues during anesthesia, Set expectations for post-procedure period and Allowed opportunity for questions and acknowledgement of understanding      Vitals: (Last set prior to Anesthesia Care Transfer)    CRNA VITALS  6/16/2020 1401 - 6/16/2020 1441      6/16/2020             Pulse:  104    SpO2:  100 %                Electronically Signed By: MAURICIO Cornejo CRNA  June 16, 2020  2:41 PM

## 2020-06-16 NOTE — DISCHARGE INSTRUCTIONS
Same-Day Surgery   Adult Discharge Orders & Instructions     For 24 hours after surgery:  1. Get plenty of rest.  A responsible adult must stay with you for at least 24 hours after you leave the hospital.   2. Pain medication can slow your reflexes. Do not drive or use heavy equipment.  If you have weakness or tingling, don't drive or use heavy equipment until this feeling goes away.  3. Mixing alcohol and pain medication can cause dizziness and slow your breathing. It can even be fatal. Do not drink alcohol while taking pain medication.  4. Avoid strenuous or risky activities.  Ask for help when climbing stairs.   5. You may feel lightheaded.  If so, sit for a few minutes before standing.  Have someone help you get up.   6. If you have nausea (feel sick to your stomach), drink only clear liquids such as apple juice, ginger ale, broth or 7-Up.  Rest may also help.  Be sure to drink enough fluids.  Move to a regular diet as you feel able. Take pain medications with a small amount of solid food, such as toast or crackers, to avoid nausea.   7. A slight fever is normal. Call the doctor if your fever is over 100 F (37.7 C) (taken under the tongue) or lasts longer than 24 hours.  8. You may have a dry mouth, muscle aches, trouble sleeping or a sore throat.  These symptoms should go away after 24 hours.  9. Do not make important or legal decisions.   Pain Management:      1. Take pain medication (if prescribed) for pain as directed by your physician.        2. WARNING: If the pain medication you have been prescribed contains Tylenol  (acetaminophen), DO NOT take additional doses of Tylenol (acetaminophen).     Call your doctor for any of the followin.  Signs of infection (fever, growing tenderness at the surgery site, severe pain, a large amount of drainage or bleeding, foul-smelling drainage, redness, swelling).    2.  It has been over 8 to 10 hours since surgery and you are still not able to urinate (pee).    3.   Headache for over 24 hours.    4.  Numbness, tingling or weakness the day after surgery (if you had spinal anesthesia).  To contact a doctor, call _____________________________________ or:      [phone] and ask for the Resident On Call for:          __________________________________________ (answered 24 hours a day)      Emergency Department:  [place] [facility]: [phone]  [place] [facility]: [phone]               Rev. 10/2014

## 2020-06-16 NOTE — BRIEF OP NOTE
[facility], [place]    Brief Operative Note    Pre-operative diagnosis: Closed subcondylar fracture of right side of mandible, initial encounter (H) [S02.621A]  Post-operative diagnosis Same as pre-operative diagnosis    Procedure: Procedure(s):  CLOSED REDUCTION, MANDIBLE; application of Kevin arch bars  Surgeon: Surgeon(s) and Role:     * Carlos Oden DDS - Primary     * Guero Zafar DMD - Resident - Assisting     * Rupert Casanova DMD - Resident - Assisting  Anesthesia: General   Estimated blood loss: Less than 10 ml  Drains: None  Specimens: * No specimens in log *  Findings:   None.  Complications: None.  Implants:   Implant Name Type Inv. Item Serial No.  Lot No. LRB No. Used Action   kevin arch bar      Bilateral 2 Implanted   24g wire      Bilateral 18 Implanted       uRpert Casanova DMD   [facility] Resident, PGY-1

## 2020-06-16 NOTE — ANESTHESIA POSTPROCEDURE EVALUATION
Anesthesia POST Procedure Evaluation    Patient: Sisi Tomlin   MRN:     [id number] Gender:   female   Age:    25 year old :      1995        Preoperative Diagnosis: Closed subcondylar fracture of right side of mandible, initial encounter (H) [S02.621A]   Procedure(s):  Closed Reduction Right Mandible Subcondylar Fracture with Application of Arch Bars and Maxillomandibular Elastic Fixation   Postop Comments: No value filed.     Anesthesia Type: General       Disposition: Outpatient   Postop Pain Control: Uneventful            Sign Out: Well controlled pain   PONV: No   Neuro/Psych: Uneventful            Sign Out: Acceptable/Baseline neuro status   Airway/Respiratory: Uneventful            Sign Out: Acceptable/Baseline resp. status   CV/Hemodynamics: Uneventful            Sign Out: Acceptable CV status   Other NRE:    DID A NON-ROUTINE EVENT OCCUR? No    Event details/Postop Comments:  Patient doing well post-operatively.  No significant issues.  Hemodynamically stable, pain well controlled, nausea well controlled.  Stable for discharge from the PACU           Last Anesthesia Record Vitals:  CRNA VITALS  2020 1401 - 2020 1501      2020             Pulse:  104    SpO2:  100 %          Last PACU Vitals:  Vitals Value Taken Time   /79 2020  3:45 PM   Temp 36.8  C (98.2  F) 2020  3:15 PM   Pulse 103 2020  3:45 PM   Resp 10 2020  3:46 PM   SpO2 99 % 2020  3:46 PM   Temp src     NIBP     Pulse     SpO2     Resp     Temp     Ht Rate     Temp 2     Vitals shown include unvalidated device data.      Electronically Signed By: Garrett Ortiz MD, 2020, 3:46 PM

## 2020-06-16 NOTE — ANESTHESIA PREPROCEDURE EVALUATION
"Anesthesia Pre-Procedure Evaluation    Patient: Sisi Tomlin   MRN:     [id number] Gender:   female   Age:    25 year old :      1995        Preoperative Diagnosis: Closed subcondylar fracture of right side of mandible, initial encounter (H) [S02.621A]   Procedure(s):  CLOSED REDUCTION, MANDIBLE; application of Torres arch bars     LABS:  CBC:   Lab Results   Component Value Date    WBC 8.0 2013    WBC 5.1 2004    HGB 14.3 2013    HGB 14.2 (H) 2004    HCT 40.6 2013    HCT 41.4 2004     2013     2004     BMP:   Lab Results   Component Value Date     2013     2004    POTASSIUM 4.2 2013    POTASSIUM 4.1 2004    CHLORIDE 105 2013    CHLORIDE 104 2004    CO2 26 2013    CO2 25 2004    BUN 11 2013    BUN 13 2004    CR 0.66 2013    CR 0.43 2004    GLC 80 2013    GLC 84 2004     COAGS: No results found for: PTT, INR, FIBR  POC:   Lab Results   Component Value Date    HCG Negative 2020     OTHER:   Lab Results   Component Value Date    KWAME 9.1 2013    LIPASE 83 2004    TSH 1.36 2013    T4 1.22 2004    SED 4 2004        Preop Vitals    BP Readings from Last 3 Encounters:   20 120/86   19 110/84   17 119/81    Pulse Readings from Last 3 Encounters:   20 100   19 97   17 110      Resp Readings from Last 3 Encounters:   20 16   19 14    SpO2 Readings from Last 3 Encounters:   20 95%   19 99%   17 98%      Temp Readings from Last 1 Encounters:   20 36.7  C (98.1  F) (Temporal)    Ht Readings from Last 1 Encounters:   19 1.448 m (4' 9\")      Wt Readings from Last 1 Encounters:   20 40.8 kg (90 lb)    Estimated body mass index is 19.48 kg/m  as calculated from the following:    Height as of 19: 1.448 m (4' 9\").    Weight as of 20: 40.8 kg " (90 lb).     LDA:        History reviewed. No pertinent past medical history.   History reviewed. No pertinent surgical history.   No Known Allergies     Anesthesia Evaluation     .             ROS/MED HX    ENT/Pulmonary:  - neg pulmonary ROS     Neurologic:  - neg neurologic ROS     Cardiovascular:  - neg cardiovascular ROS       METS/Exercise Tolerance:     Hematologic:         Musculoskeletal:         GI/Hepatic:         Renal/Genitourinary:         Endo:         Psychiatric:  - neg psychiatric ROS       Infectious Disease:         Malignancy:         Other:                         PHYSICAL EXAM:   Mental Status/Neuro: A/A/O   Airway: Facies: Feasible  Mallampati: I  Mouth/Opening: Full  TM distance: > 6 cm  Neck ROM: Full   Respiratory: Auscultation: CTAB     Resp. Rate: Normal     Resp. Effort: Normal      CV: Rhythm: Regular  Rate: Age appropriate  Heart: Normal Sounds  Edema: None   Comments:      Dental: Normal Dentition                Assessment:   ASA SCORE: 2      Smoking Status:  Non-Smoker/Unknown   NPO Status: NPO Appropriate     Plan:   Anes. Type:  General   Pre-Medication: None   Induction:  IV (Standard)   Airway: ETT; Nasal   Access/Monitoring: PIV   Maintenance: Balanced     Postop Plan:   Postop Pain: Opioids  Postop Sedation/Airway: Not planned     PONV Management:   Adult Risk Factors: Female, Non-Smoker, Postop Opioids   Prevention: Ondansetron, Dexamethasone     CONSENT: Direct conversation                         Nanda Mcwilliams MD

## 2020-06-17 NOTE — ADDENDUM NOTE
Addendum  created 06/17/20 1113 by Nanda Dimas MD    Attestation recorded in Intraprocedure, Intraprocedure Attestations filed

## 2020-06-17 NOTE — ADDENDUM NOTE
Addendum  created 06/17/20 1112 by Nanda Dimas MD    Attestation recorded in Intraprocedure, Intraprocedure Attestations filed

## 2020-06-18 NOTE — OP NOTE
Procedure Date: 06/16/2020      PREOPERATIVE DIAGNOSIS:  Right mandibular subcondylar fracture.      POSTOPERATIVE DIAGNOSIS:  Right mandibular subcondylar fracture.      PROCEDURE PERFORMED:  Closed treatment of right mandibular subcondylar fracture with application of arch bars for maxillomandibular fixation.        STAFF SURGEON:  Carlos Oden DDS, MD.      RESIDENT SURGEON:  Guero Zafar DMD.       :  Rupert Casanova DMD.        ANESTHESIA:  General via nasotracheal intubation.      INDICATIONS FOR PROCEDURE:  Sisi Tomlin is a 25-year-old woman with a medical history significant for Asperger's syndrome.  She sustained a right mandibular subcondylar fracture secondary to a syncopal episode in which she fell while at home.  She was evaluated in the Emergency Department and imaging revealed the fracture pattern as stated above.  She was indicated for closed treatment of her fracture with arch bars and maxillomandibular fixation.  Risks and benefits were discussed with the patient and her father who both elected to proceed with surgery.      DESCRIPTION OF PROCEDURE:  The patient was met in the preoperative holding area and informed consent was obtained.  The surgical site was marked on the diagram, and she was taken to the operating room.  She was transferred to the operating room table and underwent a smooth induction of general anesthesia.  A nasotracheal tube was passed on the first attempt.  The tube was secured by the Oral Surgery Service.  The patient was prepped and draped in standard fashion.  A timeout was performed in accordance with [facility] policy.  A moist throat pack was placed and 10 mL of 0.25% bupivacaine with 1:200,000 epinephrine was administered intraorally.  Attention was first directed to the maxillary arch.  An Torres arch bar was trimmed to fit the arch and secured using 24-gauge wires around the dentition.  Attention was then  turned to the mandibular arch.  An Torres arch bar was trimmed to fit the arch and the arch bar was secured using 24-gauge wires around the dentition.  The patient's occlusion was stable and reproducible and we were able to achieve bilateral posterior molar contact intraoperatively, which appeared consistent with her premorbid status.  The oral cavity was then irrigated with sterile saline and the throat pack was removed with suction.  The oropharynx was found to be clear.  The patient was then placed in maxillomandibular fixation using heavy elastics.  At the completion of the procedure, all counts were found to be correct.  The patient was turned over to the Anesthesia Service for emergence.  She was extubated in the OR and taken to PACU in good condition.      The attending surgeon, Dr. Oden, was present for the entire procedure.      ESTIMATED BLOOD LOSS:  5 mL.      URINE OUTPUT:  None.      DRAINS:  None.      COMPLICATIONS:  None.      SPECIMENS:  None.      IMPLANTS:   Torres arch bars to the maxillary and mandibular arches.      DISPOSITION:  To PACU followed by discharge to home.         NARCISO ODEN DDS, MD       As dictated by CHANDNI LIRA DMD            D: 2020   T: 2020   MT:       Name:     JACINTA SHEPHERD   MRN:      [id number]        Account:        [id number]   :      1995           Procedure Date: 2020      Document: [id number]

## (undated) DEVICE — NDL 25GA 1.5" 305127

## (undated) DEVICE — SOL WATER IRRIG 1000ML BOTTLE 2F7114

## (undated) DEVICE — TOOTHBRUSH ADULT NON STERILE MDS136850

## (undated) DEVICE — LINEN TOWEL PACK X5 5464

## (undated) DEVICE — SYR 10ML FINGER CONTROL W/O NDL 309695

## (undated) DEVICE — LINEN GOWN X4 5410

## (undated) DEVICE — SU SILK 2-0 PS 18" 1588H

## (undated) DEVICE — SOL NACL 0.9% IRRIG 1000ML BOTTLE 2F7124

## (undated) DEVICE — PAD CHUX UNDERPAD 30X36" P3036C

## (undated) DEVICE — Device

## (undated) DEVICE — PACK UNIVERSAL SPLIT 29131

## (undated) DEVICE — BONE WAX 2.5GM W31G

## (undated) DEVICE — SYR EAR BULB 3OZ 0035830

## (undated) DEVICE — STRAP KNEE/BODY 31143004

## (undated) DEVICE — GLOVE PROTEXIS POWDER FREE 8.5 ORTHOPEDIC 2D73ET85

## (undated) RX ORDER — BUPIVACAINE HYDROCHLORIDE AND EPINEPHRINE 2.5; 5 MG/ML; UG/ML
INJECTION, SOLUTION INFILTRATION; PERINEURAL
Status: DISPENSED
Start: 2020-06-16

## (undated) RX ORDER — CHLORHEXIDINE GLUCONATE ORAL RINSE 1.2 MG/ML
SOLUTION DENTAL
Status: DISPENSED
Start: 2020-06-16

## (undated) RX ORDER — FENTANYL CITRATE 50 UG/ML
INJECTION, SOLUTION INTRAMUSCULAR; INTRAVENOUS
Status: DISPENSED
Start: 2020-06-16

## (undated) RX ORDER — ONDANSETRON 2 MG/ML
INJECTION INTRAMUSCULAR; INTRAVENOUS
Status: DISPENSED
Start: 2020-06-16

## (undated) RX ORDER — PROPOFOL 10 MG/ML
INJECTION, EMULSION INTRAVENOUS
Status: DISPENSED
Start: 2020-06-16

## (undated) RX ORDER — HYDROMORPHONE HYDROCHLORIDE 1 MG/ML
INJECTION, SOLUTION INTRAMUSCULAR; INTRAVENOUS; SUBCUTANEOUS
Status: DISPENSED
Start: 2020-06-16

## (undated) RX ORDER — ACETAMINOPHEN 325 MG/1
TABLET ORAL
Status: DISPENSED
Start: 2020-06-16

## (undated) RX ORDER — SODIUM CHLORIDE, SODIUM LACTATE, POTASSIUM CHLORIDE, CALCIUM CHLORIDE 600; 310; 30; 20 MG/100ML; MG/100ML; MG/100ML; MG/100ML
INJECTION, SOLUTION INTRAVENOUS
Status: DISPENSED
Start: 2020-06-16

## (undated) RX ORDER — DEXAMETHASONE SODIUM PHOSPHATE 4 MG/ML
INJECTION, SOLUTION INTRA-ARTICULAR; INTRALESIONAL; INTRAMUSCULAR; INTRAVENOUS; SOFT TISSUE
Status: DISPENSED
Start: 2020-06-16

## (undated) RX ORDER — OXYCODONE HCL 5 MG/5 ML
SOLUTION, ORAL ORAL
Status: DISPENSED
Start: 2020-06-16

## (undated) RX ORDER — LIDOCAINE HYDROCHLORIDE 20 MG/ML
INJECTION, SOLUTION EPIDURAL; INFILTRATION; INTRACAUDAL; PERINEURAL
Status: DISPENSED
Start: 2020-06-16